# Patient Record
Sex: MALE | Race: BLACK OR AFRICAN AMERICAN | NOT HISPANIC OR LATINO | Employment: FULL TIME | URBAN - METROPOLITAN AREA
[De-identification: names, ages, dates, MRNs, and addresses within clinical notes are randomized per-mention and may not be internally consistent; named-entity substitution may affect disease eponyms.]

---

## 2017-09-25 ENCOUNTER — HISTORICAL (OUTPATIENT)
Dept: SURGERY | Facility: HOSPITAL | Age: 57
End: 2017-09-25

## 2020-09-28 ENCOUNTER — HISTORICAL (OUTPATIENT)
Dept: ENDOSCOPY | Facility: HOSPITAL | Age: 60
End: 2020-09-28

## 2022-04-07 ENCOUNTER — HISTORICAL (OUTPATIENT)
Dept: ADMINISTRATIVE | Facility: HOSPITAL | Age: 62
End: 2022-04-07

## 2022-04-24 VITALS
OXYGEN SATURATION: 97 % | BODY MASS INDEX: 39.25 KG/M2 | HEIGHT: 69 IN | WEIGHT: 265 LBS | DIASTOLIC BLOOD PRESSURE: 81 MMHG | SYSTOLIC BLOOD PRESSURE: 128 MMHG

## 2022-04-30 NOTE — OP NOTE
Patient:   Rashi Townsend             MRN: [id number]            FIN: [id number]               Age:   60 years     Sex:  Male     :  1960   Associated Diagnoses:   None   Author:   Marily Gonzalez MD      Operative Note   Operative Information   Date/ Time:  2020 08:21:00.     Procedures Performed: Colonoscopy, diagnostic.     Preoperative Diagnosis: History of colon polyps.     Postoperative Diagnosis: Normal colonoscopy except grade 1-2 internal hemorrhoids.  Preparation was fair after irrigation..     Surgeon: Marily Gonzalez MD.     Assistant: GI staff.     Anesthesia: per anaesthesia service.     Speciman Removed: None.     Esimated blood loss: No blood loss.     Description of Procedure/Findings/    Complications: History and physical as per pre-operative note. Informed consent was obtained. Patient was placed in left lateral position. Sedation per anaesthesia service. Rectal exam was unremarkable. Olympus video colonoscope was introduced into the rectum and was carefully advanced to the cecum. The quality of the preparation was fair after irrigation.. Patient tolerated the procedure well without any complications..     Findings: There was moderate amount of fecal residue noted scattered throughout the colon.  Periodic irrigation was done for better visualization.  No polyps or any significant abnormality was noted to the extent of visualization except grade 1-2 internal hemorrhoids noted on withdrawal of the scope.  Estimated withdrawal time from cecum to rectum was 17 minutes and 22 seconds.  Terminal ileum was intubated during procedure and appeared to be unremarkable..     Complications: None.     Recommendations:    1.  Diet as tolerated.  2.  Repeat colonoscopy in 3-5 years.  Preparation was fair after irrigation as above..

## 2022-04-30 NOTE — OP NOTE
Patient:   Rashi Townsend             MRN: [id number]            FIN: [id number]               Age:   57 years     Sex:  Male     :  1960   Associated Diagnoses:   None   Author:   Marily Gonzalez MD      Operative Note   Operative Information   Date/ Time:  2017 08:04:00.     Procedures Performed: Colonoscopy with biopsy.     Preoperative Diagnosis: Colon screening.     Postoperative Diagnosis: 1. Hyperplastic appearing polyp, 3-4 mm, transverse colon.  Biopsies done.  2. Internal hemorrhoids, grade 2-3.  Otherwise unremarkable.  Quality of the prep was fair..     Surgeon: Marily Gonzalez MD.     Assistant: Yanet OLIVA, Fozia Lopez W.     Anesthesia: per anaesthesia service.     Speciman Removed: Yes.     Esimated blood loss: loss  2  cc.     Description of Procedure/Findings/    Complications: History and physical as per preoperative note.  Informed consent was obtained.  Patient was placed in left lateral position.  Sedation per anesthesia service.  A rectal exam was unremarkable.  Olympus video colonoscope was introduced into the rectum and was carefully advanced to cecum.  Quality of the prep was fair..     Findings: There was a hyperplastic-appearing polyp noted in the transverse colon approximately 3-4 mm.  Biopsies were done.  The remainder of the colon including cecum, ascending colon, descending colon, sigmoid colon and rectum appeared unremarkable except grade 2-3 internal hemorrhoids noted on withdrawal of the scope.  Estimated withdrawal time from cecum to rectum was 13 minutes and 36 seconds.  There were no complications..     Complications: None.     Recommendations:  1.  Follow biopsy results.  2.  If hyperplastic, repeat colonoscopy in 5 or more years.  If adenomatous, repeat colonoscopy in 2-3 years.  Quality of the prep was fair.    c.c.:  Dr. Konstantin Mae.

## 2024-07-05 DIAGNOSIS — E04.9 THYROID ENLARGEMENT: Primary | ICD-10-CM

## 2024-12-03 ENCOUNTER — HOSPITAL ENCOUNTER (OUTPATIENT)
Dept: RADIOLOGY | Facility: HOSPITAL | Age: 64
Discharge: HOME | End: 2024-12-03
Attending: INTERNAL MEDICINE
Payer: COMMERCIAL

## 2024-12-03 DIAGNOSIS — E04.9 THYROID ENLARGEMENT: ICD-10-CM

## 2024-12-03 PROCEDURE — 76536 US EXAM OF HEAD AND NECK: CPT | Mod: TC
